# Patient Record
(demographics unavailable — no encounter records)

---

## 2018-01-01 NOTE — NBADN
===========================

Datetime: 2018 12:45

===========================

   

Nsy Prov Gen Appearance:  Within Normal Limits

Nsy Prov Gen Appearance:  Within Normal Limits

Nsy Prov Skin:  Within Normal Limits

Nsy Prov Neuro:  Normal Tone; [place]; Grasp; Root; Suck

Nsy Prov Musculoskeletal:  Within Normal Limits; Full Range of Motion; Spontaneous Movement All Extre
mities; Intact Clavicles; Clavicles without Crepitus; Gluteal Folds Symmetrical; Spine Within Normal 
Limits; No Sacral Dimple/Cyst

Nsy Prov Head:  Normal Fontanelles; Normocephalic; Sutures WNL

Nsy Prov EENT:  Mouth Within Normal Limits; Ears Within Normal Limits; Eyes Within Normal Limits; Eye
s Red Reflex Bilaterally; Nose Within Normal Limits; Face Within Normal Limits

Nsy Prov Cardiovascular:  Within Normal Limits; Normal Pulses

Nsy Prov Respiratory:  Within Normal Limits

Nsy Prov GI:  Within Normal Limits; Soft; Normal Liver; Non Palpable Spleen; Patent Anus

Nsy Prov Umbilicus:  Within Normal Limits; Three Vessel Cord

Nsy Prov :  Normal Male Genitalia

Nsy Prov PE Comments:  Pt. examined in NN.Mother requesting Circ.

Nsy Prov Impression:  Healthy Term [place]; Vital Signs Appropriate; Bonding Appropriately; Voiding a
nd Stooling

Nsy Prov Plan:  Continue  Care; Circumcision Consult; Lactation Consult

Nsy Prov Impression/Plan Details:  Dx: 40.2 wks AGA Male//Mother (+)PPD w/ CXR Neg.

   PLANS: Routine NN Care

      

Nsy Prov Laboratory:  None

   

===========================

Datetime: 2018 11:04

===========================

   

Method of Delivery:  Vaginal

Infant Birthdate and Time:  2018 10:29

Gestational Age at Deliv:  40.2

Infant Sex - 1:  Female

Fetal Presentation:  Cephalic

Apgar Score 1, NB:  9

Apgar Score5, NB:  9

Mother's PT-AGE:  28

Mother's :  2

Mother's Para:  1

Mother's Livin

Mother's Primary Language MBL:  English

Mother's Blood Type:  O Positive

Mother's Group B Beta Strep:  Negative

Mother's Hepatitis B:  Negative

Mother's Gonorrhea:  Negative

Mothers Chlamydia MBL:  Negative

Mother's Rubella:  Immune (Annotations: Data stored by CPN on behalf of user)

Mother's Antibiotics # of Doses:  0

Mother's Antibiotics Time:  N/A

Mother's Tobacco Use MBL:  Never Smoker. [id number]

Mother's Marijuana MBL:  No

Mother's Alcohol MBL:  No

Mother's Cocaine/Crack MBL:  No

Mother's Illicit Drugs MBL:  No

Mothers Comments ACOG Med Hx MBL:  appendicitis at age 7yrs

Mothers Comments ACOG Inf Hx MBL:  denies 

Mother's Term:  1

Length of Rupture NB:  18.48

Admission Birthweight, NB:  3275

Infant Weight (lb) MBL:  7

Infant Weight (oz) MBL:  3

Mother's HIV+ Exposure Test MBL:  Negative

Mother's Steroids Given:  None

Mother's Steroids Not Admin:  Not Applicable

Mother's Anesthesia Labor:  Epidural

Mother's Delivery Anesthesia:  Epidural

Mother's Intrapartum Maternal Co:  None

Infant Cord Vessels:  3

Mother's RPR/VDRL:  Nonreactive

Mother's Marital Status:  /CIVIL UNION

Mother's Rule Inc Maternal Age:  Age <=35 at [place]

Mother's Rule Thalassemia:  No History of Thalassemia

Mother's Rule Neural Tube Defect:  No History of Neural Tube Defect 

Mother's Rule Congenital Heart:  No History of Congenital Heart Disease

Mother's Rule Down Syndrome:  No History of Down Syndrome

Mother's Rule Wesley-Sachs:  No History of Wesley-Sachs

Mother's Rule [place]:  No History of [place]avan

Mother's Rule Familial Dysauto:  No History of Familial Dysautonomia

Mother's Rule Sickle Cell:  No History of Sickle Cell Disease/Trait

Mother's Rule Hemophilia:  No History of Hemophilia/Blood Disorder

Mother's Rule Muscular Dystrophy:  No History of Muscular Dystrophy 

Mother's Rule Cystic Fibrosis:  No History of Cystic Fibrosis

Mother's Rule Iredell's Chor:  No History of Iredell's Chorea

Mother's Rule Mental Retardation:  No History of Mental Retardation/Autism

Mother's Rule Fragile X:  No History of Fragile X Testing

Mother's Rule Oth Inherited DO:  No History of Other Inherited/Chromosomal Disorders

Mother's Rule Maternal Metabolic:  No History of  Maternal Metabolic

Mother's Rule FOB Birth Defects:  No History of Pt Father or FOB Birth Defects

Mother's Rule Hx Stillborn MBL:  No History of Loss/Stillborn

Mother's Rule Other Genetic Hx:  No Other Genetic History

Mother's Rule Drugs/Medications:  No History of Drugs/Medications

Mother's Rule Gonorrhea:  No History of Gonorrhea

Mother's Rule Chlamydia:  No History of Chlamydia

Mother's Rule Syphilis:  No History of Syphilis

Mother's Rule HIV/AIDS Exp:  No History of HIV/Aids Exposure

Mother's Rule HPV:  No History of Human Papillomavirus

Mother's Rule Genital Herpes:  No History of Genital Herpes

Mother's Rule TB:  No History of Tuberculosis

Mother's Rule Hepatitis:  No History of Hepatitis

Mother's Rule Rash or Viral Ill:  No History of Rash or Viral Illness

Mother's Rule Diabetes:  No History of Diabetes

Mother's Rule Hypertension MBL:  No History of Hypertension

Mother's Rule Heart Disease:  No History of Heart Disease

Mother's Rule Autoimmune:  No History of Autoimmune Disorder

Mother's Rule Kidney Disease:  No History of Kidney Disease/UTI

Mother's Rule Neurologic:  No History of Neurologic/Epilepsy Disorders

Mother's Rule Psych Disorders:  No History of Psychiatric Disorder

Mother's Rule Depression/PP Dep:  No History of Depression/Postpartum Depression

Mother's Rule Hepaitis/tLiver:  No History of Hepatitis/Liver Disease

Mother's Rule Varicos/Phlebitis:  No History of Varicosities/Phlebitis

Mother's Rule Thyroid Dysfunct:  No History of Thyroid Dysfunction

Mother's Rule Trauma/Violence:  No History of Trauma/Violence

Mother's Rule Blood Transfusion:  No History of Blood Transfusions

Mother's Rule Sensitization:  No History of D (Rh) Sensitization

Mother's Rule Pulmonary:  No History of Pulmonary (Asthma, TB)

Mother's Rule Breast:  No Breast History

Mother's Rule Gyn Surgery:  No History of Gyn Surgery

Mother's Rule Hosp/Surgery:  Hospitalization/Surgery

Mother's Rule Anesthetic Comp:  No History of Anesthetic Complications

Mother's Rule Abnormal Pap:  No History of Abnormal Pap Smear

Mother's Rule Uterine Anomaly:  No History of Uterine Anomaly/WILLARD

Mother's Rule Infertility:  No History of Infertility

Mother's Rule ART Treatment:  No History of ART Treatment

Mother's Rule Other Med Disease:  No History of Other Medical Diseases

Mother's Rule Family History:  No Significant Family History

Mother's Hx Comments ACOG Gen:  denies

## 2018-01-01 NOTE — NBCIR
===========================

Datetime: 2018 18:23

===========================

   

Preformed by::  Dr. Ryan

Consent Signed:  Written Consent Signed and on Chart

Position:  Supine; Papoose Board

Circumcision Time Out:  Correct Patient Identity; Accurate Procedure Consent Form; Agreement on Proce
dure to be Done; Correct Patient Position

Site Prep:  Povidine Iodine; Sterile Drape

Circumcision Date/Time:  2018 18:10

Block/Anesthestics:  Emla Cream

Equipment Used:  Mogen Clamp

Systemic Medications:  None

Complications:  None

Status:  Excellent Cosmetic Outcome

Parents Present:  None

Procedure Note:  Informed consent obtained for circ. Pt understands is an elective procedure. Risks i
ncluded in consent in long or shotened foreskin requiring revision in future w/ urologist.   w
as prepped and draped in routine fashion. Circumcision was w/ mogen.  tolerated procedure well
. gauze w/ vaseline was applied.

   

===========================

Datetime: 2018 11:04

===========================

   

Circumcision Request:  Yes

   

===========================

Datetime: 2018 10:53

===========================

   

PT-NAME:  THAIS GUTIERREZ, BOY OF JESSICA

## 2018-01-01 NOTE — NBDCN
===========================

Datetime: 2018 08:37

===========================

   

Nsy Prov Gen Appearance:  Within Normal Limits

Nsy Prov Skin:  Within Normal Limits

Nsy Prov Neuro:  Normal Tone; [place]; Grasp; Root; Suck

Nsy Prov Musculoskeletal:  Within Normal Limits; Full Range of Motion; Spontaneous Movement All Extre
mities; Intact Clavicles; Clavicles without Crepitus; Gluteal Folds Symmetrical; Spine Within Normal 
Limits; No Sacral Dimple/Cyst

Nsy Prov Head:  Normal Fontanelles; Normocephalic; Sutures WNL

Nsy Prov EENT:  Mouth Within Normal Limits; Ears Within Normal Limits; Eyes Within Normal Limits; Eye
s Red Reflex Bilaterally; Nose Within Normal Limits; Face Within Normal Limits

Nsy Prov Cardiovascular:  Within Normal Limits; Normal Pulses

Nsy Prov Respiratory:  Within Normal Limits

Nsy Prov GI:  Within Normal Limits; Soft; Normal Liver; Non Palpable Spleen; Patent Anus

Nsy Prov Umbilicus:  Within Normal Limits; Three Vessel Cord

Nsy Prov :  Normal Male Genitalia

Nsy Prov Discharge:  Discharge Home Today; Healthy Term ; Vital Signs Appropriate; Bonding [place]
ropriately; Voiding and Stooling

Prov Disch Referrals:  dr Lizama

Nsy Prov Disch Comments:  term  male

Follow up in Weeks NB:  1 Week

   

===========================

Datetime: 2018 04:30

===========================

   

Formula Type:  Similac Advance

   

===========================

Datetime: 2018 22:03

===========================

   

Lab, Bilirubin Transcutaneous:  5.0

Peak Bilirubin Transcutaneous:  5.0

Bilirubin Risk Zone:  Low Risk Zone Less than 40th Percentile

Hepatitis B Vaccine NB:  2018 00:00 (Annotations: RAT @2201

   [place]

   Lot # [id number]

   Exp 19)

[place] Screenin2018 22:00 (Annotations: Slip #[id number])

Congenital Heart Screen:  Negative, Congenital Heart Screen Complete

   

===========================

Datetime: 2018 18:23

===========================

   

Circumcision Equipment:  Mogen Clamp

Circumcision Date/Time:  2018 18:10

   

===========================

Datetime: 2018 07:30

===========================

   

Blood Type:  O Positive

Lab, Direct K[place]:  Negative

   

===========================

Datetime: 2018 15:42

===========================

   

Hearing Screen Result, NB:  Right Ear Pass; Left Ear Pass

Hearing Screen Status:  Hearing Screen Complete

   

===========================

Datetime: 2018 12:55

===========================

   

Length cms, NB:  50.20

Length in, NB:  19.76

   

===========================

Datetime: 2018 11:04

===========================

   

Infant Birthdate and Time:  2018 10:29

Infant Sex - 1:  Female

Gestational Age at Deliv:  40.2

Method of Delivery:  Vaginal

Vacuum Extraction:  N/A

Forceps:  N/A

Mother's Steroids Given:  None

Apgar Score 1, NB:  9

Apgar Score5, NB:  9

Maternal Amniotic Fluid Color:  Clear

Mother's Blood Type:  O Positive

Mother's Hepatitis B:  Negative

Mother's Gonorrhea:  Negative

Mother's Chlamydia:  Negative

Mother's RPR/VDRL:  Nonreactive

Mother's HIV+ Exposure Test MBL:  Negative

Mother's Hx Herpes:  No

Mother's Rubella:  Immune (Annotations: Data stored by CPN on behalf of user)

Mother's Group Beta Strep:  Negative

Mother's Antibiotics # of Doses:  0

Admission Birthweight, NB:  3275

Infant Weight (lb) MBL:  7

Infant Weight (oz) MBL:  3

Maternal Feeding Preference:  Breast

## 2018-01-01 NOTE — NBPN
===========================

Datetime: 2018 08:59

===========================

   

Nsy Prov Gen Appearance:  Within Normal Limits

Nsy Prov Skin:  Within Normal Limits

Nsy Prov Neuro:  Normal Tone; [place]; Grasp; Root; Suck

Nsy Prov Musculoskeletal:  Within Normal Limits; Full Range of Motion; Spontaneous Movement All Extre
mities; Intact Clavicles; Clavicles without Crepitus; Gluteal Folds Symmetrical; Spine Within Normal 
Limits; No Sacral Dimple/Cyst

Nsy Prov Head:  Normal Fontanelles; Normocephalic; Sutures WNL

Nsy Prov EENT:  Mouth Within Normal Limits; Ears Within Normal Limits; Eyes Within Normal Limits; Eye
s Red Reflex Bilaterally; Nose Within Normal Limits; Face Within Normal Limits

Nsy Prov Cardiovascular:  Within Normal Limits; Normal Pulses

Nsy Prov Respiratory:  Within Normal Limits

Nsy Prov GI:  Within Normal Limits; Soft; Normal Liver; Non Palpable Spleen; Patent Anus

Nsy Prov Umbilicus:  Within Normal Limits; Three Vessel Cord

Nsy Prov :  Normal Male Genitalia

Nsy Prov Impression:  Healthy Term [place]; Vital Signs Appropriate; Bonding Appropriately; Voiding a
nd Stooling

Nsy Prov Plan:  Continue  Care

Nsy Prov Impression/Plan Details:  term  male

   

===========================

Datetime: 2018 12:45

===========================

   

Nsy Prov PE Comments:  Pt. examined in NN.Mother requesting Circ.

Nsy Prov Laboratory:  None